# Patient Record
Sex: FEMALE | Race: WHITE
[De-identification: names, ages, dates, MRNs, and addresses within clinical notes are randomized per-mention and may not be internally consistent; named-entity substitution may affect disease eponyms.]

---

## 2021-07-05 ENCOUNTER — HOSPITAL ENCOUNTER (EMERGENCY)
Dept: HOSPITAL 35 - ER | Age: 51
Discharge: HOME | End: 2021-07-05
Payer: COMMERCIAL

## 2021-07-05 VITALS — HEIGHT: 62 IN | BODY MASS INDEX: 46.01 KG/M2 | WEIGHT: 250 LBS

## 2021-07-05 VITALS — SYSTOLIC BLOOD PRESSURE: 112 MMHG | DIASTOLIC BLOOD PRESSURE: 67 MMHG

## 2021-07-05 DIAGNOSIS — S29.011A: Primary | ICD-10-CM
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
## 2021-07-05 LAB
ANION GAP SERPL CALC-SCNC: 5 MMOL/L (ref 7–16)
BASOPHILS NFR BLD AUTO: 0.6 % (ref 0–2)
BUN SERPL-MCNC: 7 MG/DL (ref 7–18)
CALCIUM SERPL-MCNC: 8.6 MG/DL (ref 8.5–10.1)
CHLORIDE SERPL-SCNC: 106 MMOL/L (ref 98–107)
CO2 SERPL-SCNC: 25 MMOL/L (ref 21–32)
CREAT SERPL-MCNC: 0.7 MG/DL (ref 0.6–1)
EOSINOPHIL NFR BLD: 8.2 % (ref 0–3)
ERYTHROCYTE [DISTWIDTH] IN BLOOD BY AUTOMATED COUNT: 13.1 % (ref 10.5–14.5)
GLUCOSE SERPL-MCNC: 115 MG/DL (ref 74–106)
GRANULOCYTES NFR BLD MANUAL: 59.4 % (ref 36–66)
HCT VFR BLD CALC: 42.4 % (ref 37–47)
HGB BLD-MCNC: 14.4 GM/DL (ref 12–15)
LYMPHOCYTES NFR BLD AUTO: 23.5 % (ref 24–44)
MCH RBC QN AUTO: 30.3 PG (ref 26–34)
MCHC RBC AUTO-ENTMCNC: 33.8 G/DL (ref 28–37)
MCV RBC: 89.7 FL (ref 80–100)
MONOCYTES NFR BLD: 8.3 % (ref 1–8)
NEUTROPHILS # BLD: 4.8 THOU/UL (ref 1.4–8.2)
PLATELET # BLD: 241 THOU/UL (ref 150–400)
POTASSIUM SERPL-SCNC: 4.5 MMOL/L (ref 3.5–5.1)
RBC # BLD AUTO: 4.73 MIL/UL (ref 4.2–5)
SODIUM SERPL-SCNC: 136 MMOL/L (ref 136–145)
TROPONIN I SERPL-MCNC: <0.06 NG/ML (ref ?–0.06)
WBC # BLD AUTO: 8 THOU/UL (ref 4–11)

## 2021-07-06 NOTE — EKG
[place] [address] [facility]
[place]  [address]
Phone:  [phone]                    ELECTROCARDIOGRAM REPORT      
_______________________________________________________________________________
 
Name:       FLAVIO CARRASQUILLO                    Room #:                     ELMER SHEPHERD#:      [id number]     Account #:      [id number]  
Admission:  21    Attend Phys:                          
Discharge:  21    Date of Birth:  [birth year]  
                                                          Report #: [id number]
   [id number]
_______________________________________________________________________________
                         [facility] ED
                                       
Test Date:    2021               Test Time:    12:07:07
Pat Name:     FLAVIO CARRASQUILLO               Department:   
Patient ID:   [id number]            Room:          
Gender:       F                        Technician:   VENKATESH
:          1970               Requested By: Cruz Horan
Order Number: [id number] MD:   Rommel Weiss
                                 Measurements
Intervals                              Axis          
Rate:         66                       P:            27
MT:           139                      QRS:          12
QRSD:         88                       T:            3
QT:           380                                    
QTc:          399                                    
                           Interpretive Statements
Sinus rhythm
Borderline T abnormalities, inferior leads
No previous ECG available for comparison
Electronically Signed On 2021 7:17:18 CDT by Rommel Wiess
[email or web address]
 
 
 
 
 
 
 
 
 
 
 
 
 
 
 
 
 
 
 
 
 
 
  <ELECTRONICALLY SIGNED>
   By: Rommel Weiss MD, [facility]    
  21     0717
D: 21 1207                           _____________________________________
T: 21 1207                           Rommel Weiss MD, FACC      /EPI